# Patient Record
Sex: MALE | Race: ASIAN
[De-identification: names, ages, dates, MRNs, and addresses within clinical notes are randomized per-mention and may not be internally consistent; named-entity substitution may affect disease eponyms.]

---

## 2019-08-05 ENCOUNTER — HOSPITAL ENCOUNTER (OUTPATIENT)
Dept: HOSPITAL 47 - PROCWHC3 | Age: 74
Discharge: HOME | End: 2019-08-05
Payer: MEDICARE

## 2019-08-05 DIAGNOSIS — Z53.9: Primary | ICD-10-CM

## 2020-01-23 ENCOUNTER — HOSPITAL ENCOUNTER (INPATIENT)
Dept: HOSPITAL 47 - EC | Age: 75
LOS: 2 days | Discharge: HOME | DRG: 309 | End: 2020-01-25
Attending: INTERNAL MEDICINE | Admitting: INTERNAL MEDICINE
Payer: MEDICARE

## 2020-01-23 DIAGNOSIS — I44.0: ICD-10-CM

## 2020-01-23 DIAGNOSIS — M10.9: ICD-10-CM

## 2020-01-23 DIAGNOSIS — Z95.810: ICD-10-CM

## 2020-01-23 DIAGNOSIS — I47.2: ICD-10-CM

## 2020-01-23 DIAGNOSIS — Z79.899: ICD-10-CM

## 2020-01-23 DIAGNOSIS — Z79.82: ICD-10-CM

## 2020-01-23 DIAGNOSIS — Z98.2: ICD-10-CM

## 2020-01-23 DIAGNOSIS — E11.9: ICD-10-CM

## 2020-01-23 DIAGNOSIS — Z79.51: ICD-10-CM

## 2020-01-23 DIAGNOSIS — I50.22: ICD-10-CM

## 2020-01-23 DIAGNOSIS — Z79.01: ICD-10-CM

## 2020-01-23 DIAGNOSIS — Z87.01: ICD-10-CM

## 2020-01-23 DIAGNOSIS — R79.89: ICD-10-CM

## 2020-01-23 DIAGNOSIS — I42.8: ICD-10-CM

## 2020-01-23 DIAGNOSIS — H91.90: ICD-10-CM

## 2020-01-23 DIAGNOSIS — I48.0: Primary | ICD-10-CM

## 2020-01-23 DIAGNOSIS — E87.5: ICD-10-CM

## 2020-01-23 DIAGNOSIS — Z87.891: ICD-10-CM

## 2020-01-23 DIAGNOSIS — G91.9: ICD-10-CM

## 2020-01-23 DIAGNOSIS — I45.10: ICD-10-CM

## 2020-01-23 DIAGNOSIS — J44.9: ICD-10-CM

## 2020-01-23 DIAGNOSIS — I11.0: ICD-10-CM

## 2020-01-23 LAB
ALBUMIN SERPL-MCNC: 4.1 G/DL (ref 3.5–5)
ALP SERPL-CCNC: 36 U/L (ref 38–126)
ALT SERPL-CCNC: 14 U/L (ref 4–49)
ANION GAP SERPL CALC-SCNC: 8 MMOL/L
APTT BLD: 24.5 SEC (ref 22–30)
AST SERPL-CCNC: 46 U/L (ref 17–59)
BASOPHILS # BLD AUTO: 0.3 K/UL (ref 0–0.2)
BASOPHILS NFR BLD AUTO: 3 %
BUN SERPL-SCNC: 22 MG/DL (ref 9–20)
CALCIUM SPEC-MCNC: 8.9 MG/DL (ref 8.4–10.2)
CHLORIDE SERPL-SCNC: 100 MMOL/L (ref 98–107)
CK SERPL-CCNC: 96 U/L (ref 55–170)
CO2 SERPL-SCNC: 31 MMOL/L (ref 22–30)
DIGOXIN SERPL-MCNC: <0.4 NG/ML
EOSINOPHIL # BLD AUTO: 0.3 K/UL (ref 0–0.7)
EOSINOPHIL NFR BLD AUTO: 3 %
ERYTHROCYTE [DISTWIDTH] IN BLOOD BY AUTOMATED COUNT: 5.12 M/UL (ref 4.3–5.9)
ERYTHROCYTE [DISTWIDTH] IN BLOOD: 14 % (ref 11.5–15.5)
GLUCOSE SERPL-MCNC: 205 MG/DL (ref 74–99)
HCT VFR BLD AUTO: 48.6 % (ref 39–53)
HGB BLD-MCNC: 16 GM/DL (ref 13–17.5)
INR PPP: 1 (ref ?–1.2)
LYMPHOCYTES # SPEC AUTO: 1.6 K/UL (ref 1–4.8)
LYMPHOCYTES NFR SPEC AUTO: 14 %
MAGNESIUM SPEC-SCNC: 2.4 MG/DL (ref 1.6–2.3)
MCH RBC QN AUTO: 31.3 PG (ref 25–35)
MCHC RBC AUTO-ENTMCNC: 33 G/DL (ref 31–37)
MCV RBC AUTO: 94.9 FL (ref 80–100)
MONOCYTES # BLD AUTO: 0.6 K/UL (ref 0–1)
MONOCYTES NFR BLD AUTO: 5 %
NEUTROPHILS # BLD AUTO: 8.4 K/UL (ref 1.3–7.7)
NEUTROPHILS NFR BLD AUTO: 74 %
PLATELET # BLD AUTO: 240 K/UL (ref 150–450)
POTASSIUM SERPL-SCNC: 5.2 MMOL/L (ref 3.5–5.1)
PROT SERPL-MCNC: 7.3 G/DL (ref 6.3–8.2)
PT BLD: 10.8 SEC (ref 9–12)
SODIUM SERPL-SCNC: 139 MMOL/L (ref 137–145)
WBC # BLD AUTO: 11.4 K/UL (ref 3.8–10.6)

## 2020-01-23 PROCEDURE — 94760 N-INVAS EAR/PLS OXIMETRY 1: CPT

## 2020-01-23 PROCEDURE — 80048 BASIC METABOLIC PNL TOTAL CA: CPT

## 2020-01-23 PROCEDURE — 85025 COMPLETE CBC W/AUTO DIFF WBC: CPT

## 2020-01-23 PROCEDURE — 36415 COLL VENOUS BLD VENIPUNCTURE: CPT

## 2020-01-23 PROCEDURE — 94640 AIRWAY INHALATION TREATMENT: CPT

## 2020-01-23 PROCEDURE — 84484 ASSAY OF TROPONIN QUANT: CPT

## 2020-01-23 PROCEDURE — 83735 ASSAY OF MAGNESIUM: CPT

## 2020-01-23 PROCEDURE — 82550 ASSAY OF CK (CPK): CPT

## 2020-01-23 PROCEDURE — 96360 HYDRATION IV INFUSION INIT: CPT

## 2020-01-23 PROCEDURE — 71046 X-RAY EXAM CHEST 2 VIEWS: CPT

## 2020-01-23 PROCEDURE — 83880 ASSAY OF NATRIURETIC PEPTIDE: CPT

## 2020-01-23 PROCEDURE — 85730 THROMBOPLASTIN TIME PARTIAL: CPT

## 2020-01-23 PROCEDURE — 99285 EMERGENCY DEPT VISIT HI MDM: CPT

## 2020-01-23 PROCEDURE — 80162 ASSAY OF DIGOXIN TOTAL: CPT

## 2020-01-23 PROCEDURE — 85610 PROTHROMBIN TIME: CPT

## 2020-01-23 PROCEDURE — 84443 ASSAY THYROID STIM HORMONE: CPT

## 2020-01-23 PROCEDURE — 85027 COMPLETE CBC AUTOMATED: CPT

## 2020-01-23 PROCEDURE — 80053 COMPREHEN METABOLIC PANEL: CPT

## 2020-01-23 PROCEDURE — 93005 ELECTROCARDIOGRAM TRACING: CPT

## 2020-01-23 RX ADMIN — ISODIUM CHLORIDE SCH MG: 0.03 SOLUTION RESPIRATORY (INHALATION) at 20:09

## 2020-01-23 RX ADMIN — APIXABAN SCH MG: 2.5 TABLET, FILM COATED ORAL at 22:14

## 2020-01-23 RX ADMIN — ALLOPURINOL SCH MG: 100 TABLET ORAL at 22:14

## 2020-01-23 RX ADMIN — ATORVASTATIN CALCIUM STA MG: 80 TABLET, FILM COATED ORAL at 13:54

## 2020-01-23 RX ADMIN — ATORVASTATIN CALCIUM STA: 80 TABLET, FILM COATED ORAL at 13:55

## 2020-01-23 RX ADMIN — ISODIUM CHLORIDE SCH MG: 0.03 SOLUTION RESPIRATORY (INHALATION) at 23:57

## 2020-01-23 RX ADMIN — METOPROLOL TARTRATE SCH MG: 50 TABLET, FILM COATED ORAL at 22:14

## 2020-01-23 NOTE — XR
EXAMINATION TYPE: XR chest 2V

 

DATE OF EXAM: 1/23/2020

 

COMPARISON: 4/14/15

 

HISTORY: Shortness of breath

 

TECHNIQUE:  Frontal and lateral views of the chest are obtained.

 

FINDINGS:

 

Scattered senescent parenchymal changes noted. Hyperinflation compatible with COPD. 

 

No evidence for infiltrate. No evidence for atelectasis.

 

Heart size is stable.

 

Mediastinal structures are stable and grossly unremarkable.

 

No evidence for hilar prominence.

 

Degenerative changes dorsal spine. 

 

IMPRESSION:

1. No evidence for acute pulmonary disease.

## 2020-01-23 NOTE — ED
Arrhythmia/Palpitations HPI





- General


Chief Complaint: Arrhythmia/Palpitations


Stated Complaint: CHEST PAIN


Source: patient, family, EMS


Mode of arrival: EMS


Limitations: no limitations





- History of Present Illness


Initial Comments: 





The patient is a 74 male past medical history of diabetes, COPD and heart 

failure with AICD placement who presents to the emergency department from home. 

He sees Dr. Dhaliwal in office.  He had a pacemaker placed by Dr. Jasso in 

2015.  It is a [employer] device.  States that today he was down in his basement 

with a friend doing some exertional work.  States that he walked upstairs from 

the basement when his defibrillator went off.  He sat down upstairs and states 

that one off again.  He was different related to times in total.  2 times were 

witnessed by EMS when he was walking the stretcher.  He is not on the monitor at

this time.  Patient states that he has had his defibrillator go off on him twice

before however each time it was only once and he was never evaluated.  States 

that the last his pacemaker was interrogated was one year ago in office.  He 

denies feeling off prior to the defibrillation.  Denies any chest pain or 

shortness of breath.  Denies any recent infections.  No nausea, vomiting or 

diarrhea.  Denies any fevers or chills.  No alleviating, precipitating or 

modifying factors





- Related Data


                                Home Medications











 Medication  Instructions  Recorded  Confirmed


 


Allopurinol 2 cap PO BID 04/07/15 01/23/20


 


Budesonide-Formot 160-4.5 Mcg 2 puff INHALATION RT-DAILY 04/07/15 01/23/20





[Symbicort 160-4.5 Mcg Inhaler]   


 


Furosemide 40 mg PO DAILY 04/07/15 01/23/20


 


Albuterol Nebulized [Ventolin 2.5 mg INHALATION RT-Q4H 01/23/20 01/23/20





Nebulized]   


 


Albuterol Sulfate [Proair Hfa] 2 puff INHALATION RT-DAILY 01/23/20 01/23/20


 


Apixaban [Eliquis] 2.5 mg PO BID 01/23/20 01/23/20


 


Aspirin EC [Ecotrin Low Dose] 81 mg PO DAILY 01/23/20 01/23/20


 


Calcitriol [Rocaltrol] 0.25 mcg PO MO 01/23/20 01/23/20


 


Docusate [Colace] 100 mg PO DAILY 01/23/20 01/23/20


 


Ketoconazole 2% Shampoo [Nizoral] 1 applic TOPICAL AS DIRECTED 01/23/20 01/23/20


 


Metoprolol Tartrate [Lopressor] 50 mg PO BID 01/23/20 01/23/20


 


Potassium Chloride ER [K-Dur 10] 10 meq PO DAILY 01/23/20 01/23/20


 


Rosuvastatin Calcium [Crestor] 40 mg PO DAILY 01/23/20 01/23/20


 


Tiotropium 18 Mcg/Puff [Spiriva] 1 puff INHALATION RT-DAILY 01/23/20 01/23/20











                                    Allergies











Allergy/AdvReac Type Severity Reaction Status Date / Time


 


No Known Allergies Allergy   Verified 01/23/20 12:42














Review of Systems


ROS Statement: 


Those systems with pertinent positive or pertinent negative responses have been 

documented in the HPI.





ROS Other: All systems not noted in ROS Statement are negative.





Past Medical History


Past Medical History: Heart Failure, COPD, Diabetes Mellitus, Hearing Disorder /

 Deafness, Hypertension, Musculoskeletal Disorder, Pneumonia


Additional Past Medical History / Comment(s): HYDROCEPHALUS.  CARDIOMYOPATHY.  

SEE DR CUELLAR'S H&P.  GOUT.


History of Any Multi-Drug Resistant Organisms: None Reported


Past Surgical History: Ventriculoperitoneal Shunt


Additional Past Surgical History / Comment(s): SHUNT 2/16/15.


Past Anesthesia/Blood Transfusion Reactions: No Reported Reaction


Past Psychological History: No Psychological Hx Reported


Smoking Status: Former smoker





General Exam


Limitations: no limitations





Course


                                   Vital Signs











  01/23/20 01/23/20 01/23/20





  12:11 12:23 12:30


 


Temperature  98.7 F 


 


Pulse Rate  110 H 103 H


 


Respiratory  20 16





Rate   


 


Blood Pressure 129/111 136/100 136/100


 


O2 Sat by Pulse  99 98





Oximetry   














  01/23/20 01/23/20 01/23/20





  13:00 13:30 14:00


 


Temperature   


 


Pulse Rate 98 93 90


 


Respiratory 18 17 18





Rate   


 


Blood Pressure 117/104 122/91 106/86


 


O2 Sat by Pulse 99 100 





Oximetry   














  01/23/20 01/23/20 01/23/20





  15:00 15:30 17:39


 


Temperature  98.5 F 


 


Pulse Rate 85 86 92


 


Respiratory 18 20 18





Rate   


 


Blood Pressure 110/87 108/87 108/80


 


O2 Sat by Pulse  100 98





Oximetry   














EKG Findings





- EKG Comments:


EKG Findings:: EKG demonstrates sinus tachycardia with frequent PVCs.  There is 

a right bundle jackeline block.  Rate of 101.  UT interval 232.  .  QTC of 

482.





Medical Decision Making





- Medical Decision Making





Upon arrival in the patient is placed in room 5.  A thorough history and 

physical exam was performed.  We did interrogate the patient's device.  I'm 

currently awaiting a report for this.  I did recommend laboratory studies.  

Peripheral IV was established.  Lab studies demonstrated with also, 11.4.  Po

tassium 5.2.  Creatinine is 1.4 with a measurement of 1.1 in 2016.  Glucose is 

205.  Troponin elevated at 0.218.  BNP is 3830 digitoxin is drawn however found 

out that the patient was taken off of this medication.  He has been taking all 

his other medications as directed.  I did perform a chest x-ray which 

demonstrates no evidence of acute pulmonary disease.  At this time I discussed 

diagnosis, differential and treatment options.  I did recommend hospital 

admission for cardiology evaluation.  I called and discussed the case with Dr. Gallagher who accepted admission for the patient.  I will place cariology consult. 

 He is currently awaiting a bed on the floor





- Lab Data


Result diagrams: 


                                 01/23/20 13:06





                                 01/23/20 13:06


                                   Lab Results











  01/23/20 01/23/20 01/23/20 Range/Units





  13:06 13:06 13:06 


 


WBC  11.4 H    (3.8-10.6)  k/uL


 


RBC  5.12    (4.30-5.90)  m/uL


 


Hgb  16.0    (13.0-17.5)  gm/dL


 


Hct  48.6    (39.0-53.0)  %


 


MCV  94.9    (80.0-100.0)  fL


 


MCH  31.3    (25.0-35.0)  pg


 


MCHC  33.0    (31.0-37.0)  g/dL


 


RDW  14.0    (11.5-15.5)  %


 


Plt Count  240    (150-450)  k/uL


 


Neutrophils %  74    %


 


Lymphocytes %  14    %


 


Monocytes %  5    %


 


Eosinophils %  3    %


 


Basophils %  3    %


 


Neutrophils #  8.4 H    (1.3-7.7)  k/uL


 


Lymphocytes #  1.6    (1.0-4.8)  k/uL


 


Monocytes #  0.6    (0-1.0)  k/uL


 


Eosinophils #  0.3    (0-0.7)  k/uL


 


Basophils #  0.3 H    (0-0.2)  k/uL


 


PT     (9.0-12.0)  sec


 


INR     (<1.2)  


 


APTT     (22.0-30.0)  sec


 


Sodium   139   (137-145)  mmol/L


 


Potassium   5.2 H   (3.5-5.1)  mmol/L


 


Chloride   100   ()  mmol/L


 


Carbon Dioxide   31 H   (22-30)  mmol/L


 


Anion Gap   8   mmol/L


 


BUN   22 H   (9-20)  mg/dL


 


Creatinine   1.40 H   (0.66-1.25)  mg/dL


 


Est GFR (CKD-EPI)AfAm   57   (>60 ml/min/1.73 sqM)  


 


Est GFR (CKD-EPI)NonAf   50   (>60 ml/min/1.73 sqM)  


 


Glucose   205 H   (74-99)  mg/dL


 


Calcium   8.9   (8.4-10.2)  mg/dL


 


Magnesium   2.4 H   (1.6-2.3)  mg/dL


 


Total Bilirubin   0.8   (0.2-1.3)  mg/dL


 


AST   46   (17-59)  U/L


 


ALT   14   (4-49)  U/L


 


Alkaline Phosphatase   36 L   ()  U/L


 


Creatine Kinase   96   ()  U/L


 


Troponin I     (0.000-0.034)  ng/mL


 


NT-Pro-B Natriuret Pep    3830  pg/mL


 


Total Protein   7.3   (6.3-8.2)  g/dL


 


Albumin   4.1   (3.5-5.0)  g/dL


 


Digoxin   <0.4   ng/mL














  01/23/20 01/23/20 Range/Units





  13:06 13:06 


 


WBC    (3.8-10.6)  k/uL


 


RBC    (4.30-5.90)  m/uL


 


Hgb    (13.0-17.5)  gm/dL


 


Hct    (39.0-53.0)  %


 


MCV    (80.0-100.0)  fL


 


MCH    (25.0-35.0)  pg


 


MCHC    (31.0-37.0)  g/dL


 


RDW    (11.5-15.5)  %


 


Plt Count    (150-450)  k/uL


 


Neutrophils %    %


 


Lymphocytes %    %


 


Monocytes %    %


 


Eosinophils %    %


 


Basophils %    %


 


Neutrophils #    (1.3-7.7)  k/uL


 


Lymphocytes #    (1.0-4.8)  k/uL


 


Monocytes #    (0-1.0)  k/uL


 


Eosinophils #    (0-0.7)  k/uL


 


Basophils #    (0-0.2)  k/uL


 


PT  10.8   (9.0-12.0)  sec


 


INR  1.0   (<1.2)  


 


APTT  24.5   (22.0-30.0)  sec


 


Sodium    (137-145)  mmol/L


 


Potassium    (3.5-5.1)  mmol/L


 


Chloride    ()  mmol/L


 


Carbon Dioxide    (22-30)  mmol/L


 


Anion Gap    mmol/L


 


BUN    (9-20)  mg/dL


 


Creatinine    (0.66-1.25)  mg/dL


 


Est GFR (CKD-EPI)AfAm    (>60 ml/min/1.73 sqM)  


 


Est GFR (CKD-EPI)NonAf    (>60 ml/min/1.73 sqM)  


 


Glucose    (74-99)  mg/dL


 


Calcium    (8.4-10.2)  mg/dL


 


Magnesium    (1.6-2.3)  mg/dL


 


Total Bilirubin    (0.2-1.3)  mg/dL


 


AST    (17-59)  U/L


 


ALT    (4-49)  U/L


 


Alkaline Phosphatase    ()  U/L


 


Creatine Kinase    ()  U/L


 


Troponin I   0.218 H*  (0.000-0.034)  ng/mL


 


NT-Pro-B Natriuret Pep    pg/mL


 


Total Protein    (6.3-8.2)  g/dL


 


Albumin    (3.5-5.0)  g/dL


 


Digoxin    ng/mL














Disposition


Clinical Impression: 


 Defibrillator discharge





Disposition: ADMITTED AS IP TO THIS [facility]


Condition: Serious


Is patient prescribed a controlled substance at d/c from ED?: No


Decision to Admit Reason: Admit from EC


Decision Date: 01/23/20


Decision Time: 13:45

## 2020-01-23 NOTE — P.HPIM
History of Present Illness


H&P Date: 01/23/20


Chief Complaint: AICD discharge





Patient is a 74-year-old male with a known history of hypertension, diabetes 

type 2, chronic CHF with systolic dysfunction, nonischemic cardiomyopathy status

post AICD placement and previous history of smoking presented to ER with 

complaints of defibrillator went off.  Patient went to his basement with a 

friend and was doing some exertional work.  Patient climbed back to upstairs 

from the basement and sat down in the cause.  Certainly patient felt AICD 

discharge.  Patient was trying to get up and AICD fired again.  EMS was called 

and again AICD fired twice while EMS is at home.  Patient was brought to the 

hospital for further evaluation.  Patient says that his last pacemaker 

interrogation was about a year ago at his cardiologist's office.  Otherwise 

patient denied any recent illnesses.  Denied any complaints of chest pain or 

shortness of breath during or after the episode.  Patient felt tired after that.

 Denied any nausea vomiting or diaphoresis.  No cough or sputum production.  No 

fever no chills.


EKG showed Sinus tachycardia with first degree AV block and right bundle branch 

block.


Chest x-ray showed no acute cardiopulmonary process.


BNP 3830


Troponin 0.218 and 0.933


Potassium 5.2 and creatinine 1.4








Review of Systems





Constitutional: Patient denies any fever or chills .  No generalized weakness or

weight loss.  


Abdomen: Patient denied nausea vomiting and diarrhea and abdominal pain.


Cardiovascular: Patient denies any chest pain or short of breath no 

palpitations.


Respiratory: patient denied any cough is from production.  No shortness of 

breath


Neurologic: Patient denied any numbness or tingling headache.


Musculoskeletal: Patient denies any complaints of joint swelling or deformity.


Skin: Negative


Psychiatric: Negative


Endocrine: No heat or cold intolerance.  No recent weight gain.


Genitourinary: No dysuria or hematuria.


All other 14 point ROS negative except the above





Past Medical History


Past Medical History: Heart Failure, COPD, Diabetes Mellitus, Hearing Disorder /

Deafness, Hypertension, Musculoskeletal Disorder, Pneumonia


Additional Past Medical History / Comment(s): HYDROCEPHALUS.  CARDIOMYOPATHY.  

SEE DR CUELLAR'S H&P.  GOUT.


History of Any Multi-Drug Resistant Organisms: None Reported


Past Surgical History: Ventriculoperitoneal Shunt


Additional Past Surgical History / Comment(s): SHUNT 2/16/15.


Past Anesthesia/Blood Transfusion Reactions: No Reported Reaction


Past Psychological History: No Psychological Hx Reported


Smoking Status: Former smoker





Medications and Allergies


                                Home Medications











 Medication  Instructions  Recorded  Confirmed  Type


 


Allopurinol 2 cap PO BID 04/07/15 01/23/20 History


 


Budesonide-Formot 160-4.5 Mcg 2 puff INHALATION RT-DAILY 04/07/15 01/23/20 

History





[Symbicort 160-4.5 Mcg Inhaler]    


 


Furosemide 40 mg PO DAILY 04/07/15 01/23/20 History


 


Albuterol Nebulized [Ventolin 2.5 mg INHALATION RT-Q4H 01/23/20 01/23/20 History





Nebulized]    


 


Albuterol Sulfate [Proair Hfa] 2 puff INHALATION RT-DAILY 01/23/20 01/23/20 

History


 


Apixaban [Eliquis] 2.5 mg PO BID 01/23/20 01/23/20 History


 


Aspirin EC [Ecotrin Low Dose] 81 mg PO DAILY 01/23/20 01/23/20 History


 


Calcitriol [Rocaltrol] 0.25 mcg PO MO 01/23/20 01/23/20 History


 


Docusate [Colace] 100 mg PO DAILY 01/23/20 01/23/20 History


 


Ketoconazole 2% Shampoo [Nizoral] 1 applic TOPICAL AS DIRECTED 01/23/20 01/23/20

 History


 


Metoprolol Tartrate [Lopressor] 50 mg PO BID 01/23/20 01/23/20 History


 


Potassium Chloride ER [K-Dur 10] 10 meq PO DAILY 01/23/20 01/23/20 History


 


Rosuvastatin Calcium [Crestor] 40 mg PO DAILY 01/23/20 01/23/20 History


 


Tiotropium 18 Mcg/Puff [Spiriva] 1 puff INHALATION RT-DAILY 01/23/20 01/23/20 

History








                                    Allergies











Allergy/AdvReac Type Severity Reaction Status Date / Time


 


No Known Allergies Allergy   Verified 01/23/20 12:42














Physical Exam


Vitals: 


                                   Vital Signs











  Temp Pulse Resp BP Pulse Ox


 


 01/23/20 20:22   97   


 


 01/23/20 20:10   95   


 


 01/23/20 17:39   92  18  108/80  98


 


 01/23/20 15:30  98.5 F  86  20  108/87  100


 


 01/23/20 15:00   85  18  110/87 


 


 01/23/20 14:00   90  18  106/86 


 


 01/23/20 13:30   93  17  122/91  100


 


 01/23/20 13:00   98  18  117/104  99


 


 01/23/20 12:30   103 H  16  136/100  98


 


 01/23/20 12:23  98.7 F  110 H  20  136/100  99


 


 01/23/20 12:11     129/111 








                                Intake and Output











 01/23/20 01/23/20 01/23/20





 06:59 14:59 22:59


 


Other:   


 


  Weight  67.585 kg 














PHYSICAL EXAMINATION: 


Patient is lying in the bed comfortably, no acute distress, awake alert and 

oriented.. 


HEENT: Normocephalic. Neck is supple. Pupils reactive. Nostrils clear. Oral 

cavity is moist. Ears reveal no drainage. 


Neck reveals no JVD, carotid bruits, or thyromegaly. 


CHEST EXAMINATION: Trachea is central. Symmetrical expansion. Lung fields clear 

to auscultation and percussion. 


CARDIAC: Normal S1, S2 with no gallops. No murmurs 


ABDOMEN: Soft. Bowel sounds normal. No organomegaly. No abdominal bruits. 


Extremities: reveal no edema.  No clubbing or cyanosis


Neurologically awake, alert, oriented x3 with well-coordinated movements.  No 

focal deficits noted


Skin: No rash or skin lesions. 


Psychiatric: Coperative.  Nonsuicidal


Musculoskeletal: No joint swelling or deformity.  Normal range of motion.








Results


CBC & Chem 7: 


                                 01/23/20 13:06





                                 01/23/20 13:06


Labs: 


                  Abnormal Lab Results - Last 24 Hours (Table)











  01/23/20 01/23/20 01/23/20 Range/Units





  13:06 13:06 13:06 


 


WBC  11.4 H    (3.8-10.6)  k/uL


 


Neutrophils #  8.4 H    (1.3-7.7)  k/uL


 


Basophils #  0.3 H    (0-0.2)  k/uL


 


Potassium   5.2 H   (3.5-5.1)  mmol/L


 


Carbon Dioxide   31 H   (22-30)  mmol/L


 


BUN   22 H   (9-20)  mg/dL


 


Creatinine   1.40 H   (0.66-1.25)  mg/dL


 


Glucose   205 H   (74-99)  mg/dL


 


Magnesium   2.4 H   (1.6-2.3)  mg/dL


 


Alkaline Phosphatase   36 L   ()  U/L


 


Troponin I    0.218 H*  (0.000-0.034)  ng/mL














  01/23/20 Range/Units





  18:50 


 


WBC   (3.8-10.6)  k/uL


 


Neutrophils #   (1.3-7.7)  k/uL


 


Basophils #   (0-0.2)  k/uL


 


Potassium   (3.5-5.1)  mmol/L


 


Carbon Dioxide   (22-30)  mmol/L


 


BUN   (9-20)  mg/dL


 


Creatinine   (0.66-1.25)  mg/dL


 


Glucose   (74-99)  mg/dL


 


Magnesium   (1.6-2.3)  mg/dL


 


Alkaline Phosphatase   ()  U/L


 


Troponin I  0.933 H*  (0.000-0.034)  ng/mL














Thrombosis Risk Factor Assmnt





- DVT/VTE Prophylaxis


DVT/VTE Prophylaxis: Pharmacologic Prophylaxis ordered





Assessment and Plan


Assessment: 





AICD discharge.  Rule out arrhythmia.


Nonischemic cardiomyopathy status post AICD placement in 2015


Chronic CHF with systolic dysfunction


Hydrocephalus status post ventricular peritoneal shunt


Hearing disorder


COPD


Previous history of smoking


Hypertension


Diabetes type 2 nausea independent


History of gout


Mild hyperkalemia potassium 5.1 admission





Plan:


Patient will be continued on telemetry monitoring.  Continue with home 

medications including metoprolol.  Cardiology was consulted.  Pacemaker to be 

interrogated.  Continue to monitor closely and further recommendations based on 

the clinical course.


Time with Patient: Greater than 30

## 2020-01-24 LAB
ANION GAP SERPL CALC-SCNC: 10 MMOL/L
BUN SERPL-SCNC: 27 MG/DL (ref 9–20)
CALCIUM SPEC-MCNC: 9.2 MG/DL (ref 8.4–10.2)
CHLORIDE SERPL-SCNC: 98 MMOL/L (ref 98–107)
CO2 SERPL-SCNC: 32 MMOL/L (ref 22–30)
ERYTHROCYTE [DISTWIDTH] IN BLOOD BY AUTOMATED COUNT: 4.93 M/UL (ref 4.3–5.9)
ERYTHROCYTE [DISTWIDTH] IN BLOOD: 14 % (ref 11.5–15.5)
GLUCOSE BLD-MCNC: 142 MG/DL (ref 75–99)
GLUCOSE SERPL-MCNC: 158 MG/DL (ref 74–99)
HCT VFR BLD AUTO: 47 % (ref 39–53)
HGB BLD-MCNC: 15.4 GM/DL (ref 13–17.5)
MAGNESIUM SPEC-SCNC: 2.4 MG/DL (ref 1.6–2.3)
MCH RBC QN AUTO: 31.3 PG (ref 25–35)
MCHC RBC AUTO-ENTMCNC: 32.8 G/DL (ref 31–37)
MCV RBC AUTO: 95.4 FL (ref 80–100)
PLATELET # BLD AUTO: 267 K/UL (ref 150–450)
POTASSIUM SERPL-SCNC: 4.2 MMOL/L (ref 3.5–5.1)
SODIUM SERPL-SCNC: 140 MMOL/L (ref 137–145)
WBC # BLD AUTO: 13.1 K/UL (ref 3.8–10.6)

## 2020-01-24 PROCEDURE — 4B02XTZ MEASUREMENT OF CARDIAC DEFIBRILLATOR, EXTERNAL APPROACH: ICD-10-PCS

## 2020-01-24 RX ADMIN — IPRATROPIUM BROMIDE SCH: 0.5 SOLUTION RESPIRATORY (INHALATION) at 11:47

## 2020-01-24 RX ADMIN — ISODIUM CHLORIDE SCH MG: 0.03 SOLUTION RESPIRATORY (INHALATION) at 09:03

## 2020-01-24 RX ADMIN — ATORVASTATIN CALCIUM SCH MG: 80 TABLET, FILM COATED ORAL at 08:48

## 2020-01-24 RX ADMIN — METOPROLOL TARTRATE SCH MG: 50 TABLET, FILM COATED ORAL at 21:49

## 2020-01-24 RX ADMIN — ISODIUM CHLORIDE SCH MG: 0.03 SOLUTION RESPIRATORY (INHALATION) at 16:01

## 2020-01-24 RX ADMIN — IPRATROPIUM BROMIDE SCH MG: 0.5 SOLUTION RESPIRATORY (INHALATION) at 19:57

## 2020-01-24 RX ADMIN — ISODIUM CHLORIDE SCH MG: 0.03 SOLUTION RESPIRATORY (INHALATION) at 23:05

## 2020-01-24 RX ADMIN — METOPROLOL TARTRATE SCH MG: 50 TABLET, FILM COATED ORAL at 08:47

## 2020-01-24 RX ADMIN — ISODIUM CHLORIDE SCH MG: 0.03 SOLUTION RESPIRATORY (INHALATION) at 19:57

## 2020-01-24 RX ADMIN — APIXABAN SCH MG: 2.5 TABLET, FILM COATED ORAL at 08:48

## 2020-01-24 RX ADMIN — IPRATROPIUM BROMIDE SCH MG: 0.5 SOLUTION RESPIRATORY (INHALATION) at 16:01

## 2020-01-24 RX ADMIN — IPRATROPIUM BROMIDE SCH MG: 0.5 SOLUTION RESPIRATORY (INHALATION) at 09:03

## 2020-01-24 RX ADMIN — APIXABAN SCH MG: 5 TABLET, FILM COATED ORAL at 21:49

## 2020-01-24 RX ADMIN — FUROSEMIDE SCH MG: 40 TABLET ORAL at 08:49

## 2020-01-24 RX ADMIN — DOCUSATE SODIUM SCH MG: 100 CAPSULE, LIQUID FILLED ORAL at 08:48

## 2020-01-24 RX ADMIN — ISODIUM CHLORIDE SCH: 0.03 SOLUTION RESPIRATORY (INHALATION) at 11:47

## 2020-01-24 RX ADMIN — ALLOPURINOL SCH MG: 100 TABLET ORAL at 21:48

## 2020-01-24 RX ADMIN — ASPIRIN 81 MG CHEWABLE TABLET SCH MG: 81 TABLET CHEWABLE at 08:48

## 2020-01-24 RX ADMIN — ALLOPURINOL SCH MG: 100 TABLET ORAL at 08:48

## 2020-01-24 RX ADMIN — BUDESONIDE AND FORMOTEROL FUMARATE DIHYDRATE SCH PUFF: 160; 4.5 AEROSOL RESPIRATORY (INHALATION) at 09:03

## 2020-01-24 RX ADMIN — ISODIUM CHLORIDE SCH: 0.03 SOLUTION RESPIRATORY (INHALATION) at 03:54

## 2020-01-24 NOTE — P.CRDCN
History of Present Illness


History of present illness: 


This is Ewelina Zamora PA-C dictating a consult on this patient


The patient was interviewed and examined by me as well as by Dr. Mcgraw


Case discussed with Dr. Mcgraw and he agrees with the plan of care





HPI


Patient is a 74-year-old male with a past medical history of persistant atrial 

fibrillation and nonischemic cardiomyopathy status post ICD who presented after 

4 ICD shocks.  Patient follows with Dr. BUNNY Dhaliwal.  He states he was in his 

basement walking towards the stairs when he experienced an ICD shock.  He did 

not have any symptoms prior to the shock.  he denies palpitations, dizziness, 

chest pressure, shortness of breath.  He was not doing anything exertional prior

to the shock.  He made his way up the stairs and experienced 3 more shocks.  He 

did not have any dizziness or loss of consciousness.  No chest pain.  In the 

preceding few weeks he had some dizziness on and off but denied any dizziness at

that time.  No loss of consciousness.  His wife called EMS.  EKG on arrival 

shows sinus mechanism with PVCs.  No acute ST or T-wave changes.  Labs were 

significant for BUN 22, creatinine 1.4, potassium 5.2, magnesium 2.4.  Patient 

seen and examined sitting in his chair.  No further shocks.  Denies 

palpitations, dizziness, chest pain or shortness of breath.





ROS: 


No fevers, chills or rigors, 


no cough, phlegm or expectoration, 


no nausea, vomiting or diarrhea, 


no hematuria, dysuria, 


no musculoskeletal complaints, 


no strokes or seizures, 


no skin lesions.





EXAMINATION:


Patient is afebrile, pulse in the 90s, respirations 18, blood pressure 122/80, 

oxygen saturation 94% on room air


Patient seen and examined resting comfortably in his chair, in no acute distress


Lungs are clear to auscultation bilaterally no wheezing rhonchi or crackles


Heart is irregularly irregular.  No audible murmurs


No lower extremity edema





REVIEW OF LABS, ECG & MEDICAL DATA


WBC 13.1, hemoglobin 14.4, platelets 267, potassium 4, BUN 27, creatinine 1.56, 

magnesium 2.4


Troponin 0.933, 0.218


Had a few episodes of NSVT on telemetry





IMPRESSION / ASSESSMENT: 


74-year-old male presenting after 4 ICD shocks, likely inappropriate shocks due 

to atrial fibrillation with aberrancy


Elevated troponin secondary to above


Nonischemic cardiomyopathy status post ICD placement


Paroxysmal atrial fibrillation, anticoagulated on eliquis





PLAN:


Device was interrogated, shows shows wide complex tachycardia consistent with 

aberrancy, device was reprogrammed, please see separate dictation by Dr. Mcgraw

for the details


Increase metoprolol to 100 mg twice a day


Increase eliquis to 5 mg twice a day, rate dose given his age weight and renal 

function


Check TSH











Past Medical History


Past Medical History: Heart Failure, COPD, Diabetes Mellitus, Hearing Disorder /

Deafness, Hypertension, Musculoskeletal Disorder, Pneumonia


Additional Past Medical History / Comment(s): HYDROCEPHALUS.  CARDIOMYOPATHY.  

SEE DR MCGRAW'S H&P.  GOUT.


History of Any Multi-Drug Resistant Organisms: None Reported


Past Surgical History: Ventriculoperitoneal Shunt


Additional Past Surgical History / Comment(s): SHUNT 2/16/15.


Past Anesthesia/Blood Transfusion Reactions: No Reported Reaction


Past Psychological History: No Psychological Hx Reported


Smoking Status: Former smoker





Medications and Allergies


                                Home Medications











 Medication  Instructions  Recorded  Confirmed  Type


 


Allopurinol 2 cap PO BID 04/07/15 01/23/20 History


 


Budesonide-Formot 160-4.5 Mcg 2 puff INHALATION RT-DAILY 04/07/15 01/23/20 

History





[Symbicort 160-4.5 Mcg Inhaler]    


 


Furosemide 40 mg PO DAILY 04/07/15 01/23/20 History


 


Albuterol Nebulized [Ventolin 2.5 mg INHALATION RT-Q4H 01/23/20 01/23/20 History





Nebulized]    


 


Albuterol Sulfate [Proair Hfa] 2 puff INHALATION RT-DAILY 01/23/20 01/23/20 

History


 


Apixaban [Eliquis] 2.5 mg PO BID 01/23/20 01/23/20 History


 


Aspirin EC [Ecotrin Low Dose] 81 mg PO DAILY 01/23/20 01/23/20 History


 


Calcitriol [Rocaltrol] 0.25 mcg PO MO 01/23/20 01/23/20 History


 


Docusate [Colace] 100 mg PO DAILY 01/23/20 01/23/20 History


 


Ketoconazole 2% Shampoo [Nizoral] 1 applic TOPICAL AS DIRECTED 01/23/20 01/23/20

 History


 


Metoprolol Tartrate [Lopressor] 50 mg PO BID 01/23/20 01/23/20 History


 


Potassium Chloride ER [K-Dur 10] 10 meq PO DAILY 01/23/20 01/23/20 History


 


Rosuvastatin Calcium [Crestor] 40 mg PO DAILY 01/23/20 01/23/20 History


 


Tiotropium 18 Mcg/Puff [Spiriva] 1 puff INHALATION RT-DAILY 01/23/20 01/23/20 

History








                                    Allergies











Allergy/AdvReac Type Severity Reaction Status Date / Time


 


No Known Allergies Allergy   Verified 01/23/20 12:42














Physical Exam


Vitals: 


                                   Vital Signs











  Temp Pulse Pulse Resp BP BP Pulse Ox


 


 01/24/20 11:00  97.7 F   114 H  18   122/80  94 L


 


 01/24/20 09:28   90     


 


 01/24/20 09:03   88     


 


 01/24/20 08:47    63  18   


 


 01/24/20 08:00  98.2 F   63  18   135/79  94 L


 


 01/24/20 04:40     18   132/75  96


 


 01/24/20 04:00     18   


 


 01/24/20 00:11   92     


 


 01/24/20 00:00     18   


 


 01/23/20 23:58   96     


 


 01/23/20 20:22   97     


 


 01/23/20 20:10   95     


 


 01/23/20 20:00   95   18  107/83   99


 


 01/23/20 19:00   97   17  100/85  


 


 01/23/20 18:00   93   20  108/80  


 


 01/23/20 17:39   92   18  108/80   98


 


 01/23/20 17:00   93   19  115/92  


 


 01/23/20 16:00   85   18  105/81  


 


 01/23/20 15:30  98.5 F  86   20  108/87   100


 


 01/23/20 15:05  98 F    18    95


 


 01/23/20 15:00   85   18  110/87  








                                Intake and Output











 01/23/20 01/24/20 01/24/20





 22:59 06:59 14:59


 


Intake Total 200 200 220


 


Balance 200 200 220


 


Intake:   


 


  IV   20


 


    Invasive Line 1   20


 


  Oral 200 200 200


 


Other:   


 


  Weight 67.585 kg 68.3 kg 














Results





                                 01/24/20 08:55





                                 01/24/20 08:55


                                 Cardiac Enzymes











  01/23/20 01/23/20 01/24/20 Range/Units





  13:06 18:50 00:31 


 


Troponin I  0.218 H*  0.933 H*  0.807 H*  (0.000-0.034)  ng/mL








                                       CBC











  01/24/20 Range/Units





  08:55 


 


WBC  13.1 H  (3.8-10.6)  k/uL


 


RBC  4.93  (4.30-5.90)  m/uL


 


Hgb  15.4  (13.0-17.5)  gm/dL


 


Hct  47.0  (39.0-53.0)  %


 


Plt Count  267  (150-450)  k/uL








                          Comprehensive Metabolic Panel











  01/24/20 Range/Units





  08:55 


 


Sodium  140  (137-145)  mmol/L


 


Potassium  4.2  (3.5-5.1)  mmol/L


 


Chloride  98  ()  mmol/L


 


Carbon Dioxide  32 H  (22-30)  mmol/L


 


BUN  27 H  (9-20)  mg/dL


 


Creatinine  1.56 H  (0.66-1.25)  mg/dL


 


Glucose  158 H  (74-99)  mg/dL


 


Calcium  9.2  (8.4-10.2)  mg/dL








                               Current Medications











Generic Name Dose Route Start Last Admin





  Trade Name Freq  PRN Reason Stop Dose Admin


 


Albuterol Sulfate  2.5 mg  01/23/20 20:00  01/24/20 11:47





  Ventolin Nebulized  INHALATION   Not Given





  RT-Q4H [facility]  


 


Allopurinol  200 mg  01/23/20 21:00  01/24/20 08:48





  Zyloprim  PO   200 mg





  BID [facility]ARY   Administration


 


Apixaban  5 mg  01/24/20 21:00 





  Eliquis  PO  





  BID [facility]  


 


Aspirin  81 mg  01/24/20 09:00  01/24/20 08:48





  Aspirin  PO   81 mg





  DAILY [facility]   Administration


 


Atorvastatin Calcium  80 mg  01/24/20 09:00  01/24/20 08:48





  Lipitor  PO   80 mg





  DAILY [facility]   Administration


 


Budesonide/Formoterol Fumarate  2 puff  01/24/20 08:00  01/24/20 09:03





  Symbicort 160-4.5 Mcg Inhaler  INHALATION   2 puff





  RT-DAILY [facility]   Administration


 


Calcitriol  0.25 mcg  01/27/20 09:00 





  Rocaltrol  PO  





  MO [facility]  


 


Docusate Sodium  100 mg  01/24/20 09:00  01/24/20 08:48





  Colace  PO   100 mg





  DAILY [facility]   Administration


 


Furosemide  40 mg  01/24/20 09:00  01/24/20 08:49





  Lasix  PO   40 mg





  DAILY [facility]   Administration


 


Ipratropium Bromide  0.5 mg  01/24/20 08:00  01/24/20 11:47





  Atrovent Nebulized  INHALATION   Not Given





  RT-QID [facility]  


 


Metoprolol Tartrate  100 mg  01/24/20 21:00 





  Lopressor  PO  





  BID [facility]  


 


Naloxone HCl  0.2 mg  01/23/20 13:46 





  Narcan  IV  





  Q2M PRN  





  Opioid Reversal  








                                Intake and Output











 01/23/20 01/24/20 01/24/20





 22:59 06:59 14:59


 


Intake Total 200 200 220


 


Balance 200 200 220


 


Intake:   


 


  IV   20


 


    Invasive Line 1   20


 


  Oral 200 200 200


 


Other:   


 


  Weight 67.585 kg 68.3 kg 








                                        





                                 01/24/20 08:55 





                                 01/24/20 08:55

## 2020-01-24 NOTE — P.PCN
Preoperative Diagnosis: 


Since ICD, [facility] [facility] fortify sure RVR [id number] Q ICD


Patient presented with multiple ICD shocks he had 6 episodes of ventricular ta

chycardia


ATP failed in all 6 cases and 6 shocks were delivered


Patient remained in the tachycardia


RV pacing threshold 0.5 V at 0.5 ms, R waves 11.4 glucose and pacing impedance 

of 440 ohms


HV interval 78 ohms


Tachycardias were reviewed


The sustained tachycardia was a wide complex tachycardia likely aberrancy, 

slight radiation sex-linked in no impact of antitachycardia pacing or shocks


Likely atrial fibrillation/atrial tachycardia with aberrancy.  There was 0% 

morphology match but the intracardiac electrogram was similar





There is also one episode of true nonsustained ventricular tachycardia


The device was then reprogrammed according to the immediate area deprogramming 

with long detection intervals





Impression


Likely inappropriate ICD shocks secondary to atrial fibrillation/atrial tachyca

rdia


1 nonsustained ventricular tachycardia asymptomatic no therapies delivered

## 2020-01-25 VITALS — SYSTOLIC BLOOD PRESSURE: 104 MMHG | DIASTOLIC BLOOD PRESSURE: 70 MMHG | HEART RATE: 61 BPM | RESPIRATION RATE: 18 BRPM

## 2020-01-25 VITALS — TEMPERATURE: 99.6 F

## 2020-01-25 RX ADMIN — METOPROLOL TARTRATE SCH MG: 50 TABLET, FILM COATED ORAL at 08:55

## 2020-01-25 RX ADMIN — IPRATROPIUM BROMIDE AND ALBUTEROL SULFATE SCH ML: .5; 3 SOLUTION RESPIRATORY (INHALATION) at 11:36

## 2020-01-25 RX ADMIN — ATORVASTATIN CALCIUM SCH MG: 80 TABLET, FILM COATED ORAL at 08:54

## 2020-01-25 RX ADMIN — ISODIUM CHLORIDE SCH MG: 0.03 SOLUTION RESPIRATORY (INHALATION) at 03:15

## 2020-01-25 RX ADMIN — IPRATROPIUM BROMIDE AND ALBUTEROL SULFATE SCH ML: .5; 3 SOLUTION RESPIRATORY (INHALATION) at 08:03

## 2020-01-25 RX ADMIN — ALLOPURINOL SCH MG: 100 TABLET ORAL at 08:53

## 2020-01-25 RX ADMIN — ASPIRIN 81 MG CHEWABLE TABLET SCH MG: 81 TABLET CHEWABLE at 08:54

## 2020-01-25 RX ADMIN — FUROSEMIDE SCH MG: 40 TABLET ORAL at 08:54

## 2020-01-25 RX ADMIN — IPRATROPIUM BROMIDE AND ALBUTEROL SULFATE SCH: .5; 3 SOLUTION RESPIRATORY (INHALATION) at 15:36

## 2020-01-25 RX ADMIN — APIXABAN SCH MG: 5 TABLET, FILM COATED ORAL at 08:54

## 2020-01-25 RX ADMIN — DOCUSATE SODIUM SCH MG: 100 CAPSULE, LIQUID FILLED ORAL at 08:54

## 2020-01-25 RX ADMIN — BUDESONIDE AND FORMOTEROL FUMARATE DIHYDRATE SCH PUFF: 160; 4.5 AEROSOL RESPIRATORY (INHALATION) at 08:03

## 2020-01-25 NOTE — P.PN
Subjective


This is Ewelina Zamora PA-C dictating a progress note on this patient


The patient was interviewed and examined by me as well as by Dr. Mcgraw


Case discussed with Dr. Mcgraw and he agrees with the plan of care





HPI/interval history


Patient is a 74-year-old male with a past medical history of persistant atrial 

fibrillation and nonischemic cardiomyopathy status post ICD who presented after 

4 ICD shocks.  His device was interrogated and shows a wide complex tachycardia 

consistent with aberrancy.  His device was reprogrammed by Dr. Mcgraw.  We 

increased his metoprolol to 100 mg twice daily and he seems to have tolerated 

that well.  Patient seen and examined in his room.  Feels well, denies any chest

pain, palpitations, shortness of breath dizziness or syncope.





EXAMINATION


Patient is afebrile, pulse in the 60s, respirations 18, blood pressure 104/70, 

oxygen saturation 98%


Lungs are clear to auscultation bilaterally


Heart is irregularly irregular.  No audible murmurs


No lower extremity edema





REVIEW OF LABS, ECG


WBC 13, hemoglobin 15, platelets 267, potassium 4.2, BUN 27, creatinine 1.56


Telemetry reveals few short runs of NSVT





IMPRESSION / ASSESSMENT: 


74-year-old male presenting after 4 ICD shocks, likely inappropriate shocks due 

to atrial fibrillation with aberrancy, no further shocks during his admission


Elevated troponin secondary to above


Nonischemic cardiomyopathy status post ICD placement


Paroxysmal atrial fibrillation, anticoagulated on eliquis





PLAN:


Continue metoprolol 100 mg twice a day


Continue maximal cardiomyopathy medications


Patient may be discharged home from a cardiac standpoint and plan to schedule 

NIPS with Dr. Mcgraw in the next few weeks
























































Objective





- Vital Signs


Vital signs: 


                                   Vital Signs











Temp  99.6 F   01/25/20 08:15


 


Pulse  96   01/25/20 11:48


 


Resp  18   01/25/20 11:30


 


BP  104/70   01/25/20 11:30


 


Pulse Ox  98   01/25/20 11:30








                                 Intake & Output











 01/24/20 01/25/20 01/25/20





 18:59 06:59 18:59


 


Intake Total 430 400 240


 


Output Total 600  


 


Balance -170 400 240


 


Weight  67.6 kg 


 


Intake:   


 


  IV 30  


 


    Invasive Line 1 30  


 


  Oral 400 400 240


 


Output:   


 


  Urine 600  


 


Other:   


 


  Voiding Method  Toilet 


 


  # Voids   2














- Labs


CBC & Chem 7: 


                                 01/24/20 08:55





                                 01/24/20 08:55

## 2022-10-01 ENCOUNTER — HOSPITAL ENCOUNTER (INPATIENT)
Dept: HOSPITAL 47 - EC | Age: 77
LOS: 1 days | DRG: 951 | End: 2022-10-02
Attending: INTERNAL MEDICINE | Admitting: INTERNAL MEDICINE
Payer: MEDICARE

## 2022-10-01 DIAGNOSIS — Z79.82: ICD-10-CM

## 2022-10-01 DIAGNOSIS — E11.22: ICD-10-CM

## 2022-10-01 DIAGNOSIS — I48.92: ICD-10-CM

## 2022-10-01 DIAGNOSIS — Z20.822: ICD-10-CM

## 2022-10-01 DIAGNOSIS — Z51.5: Primary | ICD-10-CM

## 2022-10-01 DIAGNOSIS — N17.9: ICD-10-CM

## 2022-10-01 DIAGNOSIS — R79.89: ICD-10-CM

## 2022-10-01 DIAGNOSIS — H91.90: ICD-10-CM

## 2022-10-01 DIAGNOSIS — Z87.01: ICD-10-CM

## 2022-10-01 DIAGNOSIS — N43.3: ICD-10-CM

## 2022-10-01 DIAGNOSIS — G93.40: ICD-10-CM

## 2022-10-01 DIAGNOSIS — Z79.4: ICD-10-CM

## 2022-10-01 DIAGNOSIS — E87.5: ICD-10-CM

## 2022-10-01 DIAGNOSIS — I50.9: ICD-10-CM

## 2022-10-01 DIAGNOSIS — N18.9: ICD-10-CM

## 2022-10-01 DIAGNOSIS — Z79.01: ICD-10-CM

## 2022-10-01 DIAGNOSIS — E87.20: ICD-10-CM

## 2022-10-01 DIAGNOSIS — Z95.810: ICD-10-CM

## 2022-10-01 DIAGNOSIS — I42.9: ICD-10-CM

## 2022-10-01 DIAGNOSIS — Z98.2: ICD-10-CM

## 2022-10-01 DIAGNOSIS — F91.9: ICD-10-CM

## 2022-10-01 DIAGNOSIS — I13.0: ICD-10-CM

## 2022-10-01 DIAGNOSIS — Z79.899: ICD-10-CM

## 2022-10-01 DIAGNOSIS — Z79.51: ICD-10-CM

## 2022-10-01 DIAGNOSIS — J43.9: ICD-10-CM

## 2022-10-01 DIAGNOSIS — M10.9: ICD-10-CM

## 2022-10-01 DIAGNOSIS — I48.91: ICD-10-CM

## 2022-10-01 LAB
ALBUMIN SERPL-MCNC: 4.3 G/DL (ref 3.5–5)
ALP SERPL-CCNC: 47 U/L (ref 38–126)
ALT SERPL-CCNC: 39 U/L (ref 4–49)
ANION GAP SERPL CALC-SCNC: 23 MMOL/L
APTT BLD: 29 SEC (ref 22–30)
AST SERPL-CCNC: 78 U/L (ref 17–59)
BASOPHILS # BLD AUTO: 0 K/UL (ref 0–0.2)
BASOPHILS NFR BLD AUTO: 0 %
BUN SERPL-SCNC: 19 MG/DL (ref 9–20)
CALCIUM SPEC-MCNC: 8.7 MG/DL (ref 8.4–10.2)
CHLORIDE SERPL-SCNC: 93 MMOL/L (ref 98–107)
CO2 SERPL-SCNC: 16 MMOL/L (ref 22–30)
EOSINOPHIL # BLD AUTO: 0.1 K/UL (ref 0–0.7)
EOSINOPHIL NFR BLD AUTO: 1 %
ERYTHROCYTE [DISTWIDTH] IN BLOOD BY AUTOMATED COUNT: 4.74 M/UL (ref 4.3–5.9)
ERYTHROCYTE [DISTWIDTH] IN BLOOD: 13.1 % (ref 11.5–15.5)
GLUCOSE SERPL-MCNC: 139 MG/DL (ref 74–99)
HCT VFR BLD AUTO: 46.7 % (ref 39–53)
HGB BLD-MCNC: 15.1 GM/DL (ref 13–17.5)
INR PPP: 2 (ref ?–1.2)
LYMPHOCYTES # SPEC AUTO: 1 K/UL (ref 1–4.8)
LYMPHOCYTES NFR SPEC AUTO: 10 %
MAGNESIUM SPEC-SCNC: 2.5 MG/DL (ref 1.6–2.3)
MCH RBC QN AUTO: 31.8 PG (ref 25–35)
MCHC RBC AUTO-ENTMCNC: 32.4 G/DL (ref 31–37)
MCV RBC AUTO: 98.4 FL (ref 80–100)
MONOCYTES # BLD AUTO: 0.6 K/UL (ref 0–1)
MONOCYTES NFR BLD AUTO: 6 %
NEUTROPHILS # BLD AUTO: 8.3 K/UL (ref 1.3–7.7)
NEUTROPHILS NFR BLD AUTO: 82 %
PLATELET # BLD AUTO: 152 K/UL (ref 150–450)
POTASSIUM SERPL-SCNC: 5.8 MMOL/L (ref 3.5–5.1)
PROT SERPL-MCNC: 6.9 G/DL (ref 6.3–8.2)
PT BLD: 20.3 SEC (ref 9–12)
SODIUM SERPL-SCNC: 132 MMOL/L (ref 137–145)
WBC # BLD AUTO: 10.1 K/UL (ref 3.8–10.6)

## 2022-10-01 PROCEDURE — 85025 COMPLETE CBC W/AUTO DIFF WBC: CPT

## 2022-10-01 PROCEDURE — 84100 ASSAY OF PHOSPHORUS: CPT

## 2022-10-01 PROCEDURE — 84443 ASSAY THYROID STIM HORMONE: CPT

## 2022-10-01 PROCEDURE — 36415 COLL VENOUS BLD VENIPUNCTURE: CPT

## 2022-10-01 PROCEDURE — 87502 INFLUENZA DNA AMP PROBE: CPT

## 2022-10-01 PROCEDURE — 84484 ASSAY OF TROPONIN QUANT: CPT

## 2022-10-01 PROCEDURE — 83605 ASSAY OF LACTIC ACID: CPT

## 2022-10-01 PROCEDURE — 70450 CT HEAD/BRAIN W/O DYE: CPT

## 2022-10-01 PROCEDURE — 83735 ASSAY OF MAGNESIUM: CPT

## 2022-10-01 PROCEDURE — 83880 ASSAY OF NATRIURETIC PEPTIDE: CPT

## 2022-10-01 PROCEDURE — 85730 THROMBOPLASTIN TIME PARTIAL: CPT

## 2022-10-01 PROCEDURE — 87635 SARS-COV-2 COVID-19 AMP PRB: CPT

## 2022-10-01 PROCEDURE — 80053 COMPREHEN METABOLIC PANEL: CPT

## 2022-10-01 PROCEDURE — 71045 X-RAY EXAM CHEST 1 VIEW: CPT

## 2022-10-01 PROCEDURE — 93005 ELECTROCARDIOGRAM TRACING: CPT

## 2022-10-01 PROCEDURE — 85610 PROTHROMBIN TIME: CPT

## 2022-10-01 PROCEDURE — 94640 AIRWAY INHALATION TREATMENT: CPT

## 2022-10-01 NOTE — ED
Weakness HPI





- General


Stated complaint: SOB


Time Seen by Provider: 10/01/22 22:52


Source: RN notes reviewed, old records reviewed


Limitations: no limitations





- History of Present Illness


MD Complaint: generalized weakness, lack of energy


-: days(s)


Location: generalized


Severity: moderate


Severity scale (1-10): 7


Consistency: constant, intermittent


Improves with: evening


Context: recent illness, history of similar


Associated Symptoms: chest pain, loss of appetite, nausea/vomiting, shortness of

breath





- Related Data


                                Home Medications











 Medication  Instructions  Recorded  Confirmed


 


Budesonide-Formot 160-4.5 Mcg 2 puff INHALATION RT-DAILY 04/07/15 01/23/20





[Symbicort 160-4.5 Mcg Inhaler]   


 


Furosemide 40 mg PO DAILY 04/07/15 01/23/20


 


allopurinoL [Allopurinol] 2 cap PO BID 04/07/15 01/23/20


 


Albuterol Nebulized [Ventolin 2.5 mg INHALATION RT-Q4H 01/23/20 01/23/20





Nebulized]   


 


Albuterol Sulfate [Proair Hfa] 2 puff INHALATION RT-DAILY 01/23/20 01/23/20


 


Aspirin EC [Ecotrin Low Dose] 81 mg PO DAILY 01/23/20 01/23/20


 


Docusate [Colace] 100 mg PO DAILY 01/23/20 01/23/20


 


Ketoconazole 2% Shampoo [Nizoral] 1 applic TOPICAL AS DIRECTED 01/23/20 01/23/20


 


Rosuvastatin Calcium [Crestor] 40 mg PO DAILY 01/23/20 01/23/20


 


Tiotropium 18 Mcg/Puff [Spiriva] 1 puff INHALATION RT-DAILY 01/23/20 01/23/20


 


calcitrioL [Rocaltrol] 0.25 mcg PO MO 01/23/20 01/23/20








                                  Previous Rx's











 Medication  Instructions  Recorded


 


Apixaban [Eliquis] 5 mg PO BID #60 tab 01/25/20


 


Metoprolol Tartrate [Lopressor] 100 mg PO BID  tab 01/25/20











                                    Allergies











Allergy/AdvReac Type Severity Reaction Status Date / Time


 


No Known Allergies Allergy   Verified 01/23/20 12:42














Review of Systems


ROS Statement: 


Those systems with pertinent positive or pertinent negative responses have been 

documented in the HPI.





ROS Other: All systems not noted in ROS Statement are negative.





Past Medical History


Past Medical History: Heart Failure, COPD, Diabetes Mellitus, Hearing Disorder /

 Deafness, Hypertension, Musculoskeletal Disorder, Pneumonia


Additional Past Medical History / Comment(s): HYDROCEPHALUS.  CARDIOMYOPATHY.  

SEE DR CUELLAR'S H&P.  GOUT.


History of Any Multi-Drug Resistant Organisms: None Reported


Past Surgical History: Ventriculoperitoneal Shunt


Additional Past Surgical History / Comment(s): SHUNT 2/16/15.


Past Anesthesia/Blood Transfusion Reactions: No Reported Reaction


Past Psychological History: No Psychological Hx Reported





General Exam


General appearance: alert, in no apparent distress


Head exam: Present: atraumatic, normocephalic, normal inspection


Eye exam: Present: normal appearance, PERRL, EOMI.  Absent: scleral icterus, 

conjunctival injection, periorbital swelling


ENT exam: Present: normal exam, mucous membranes moist


Neck exam: Present: normal inspection.  Absent: tenderness, meningismus, lympha

denopathy


Respiratory exam: Present: normal lung sounds bilaterally.  Absent: respiratory 

distress, wheezes, rales, rhonchi, stridor


Cardiovascular Exam: Present: regular rate, normal rhythm, normal heart sounds. 

 Absent: systolic murmur, diastolic murmur, rubs, gallop, clicks


GI/Abdominal exam: Present: soft, normal bowel sounds.  Absent: distended, 

tenderness, guarding, rebound, rigid


Extremities exam: Present: normal inspection, full ROM, normal capillary refill.

  Absent: tenderness, pedal edema, joint swelling, calf tenderness


Back exam: Present: normal inspection


Neurological exam: Present: alert, oriented X3, CN II-XII intact


Psychiatric exam: Present: normal affect, normal mood


Skin exam: Present: warm, dry, intact, normal color.  Absent: rash





Course


                                   Vital Signs











  10/01/22 10/01/22 10/01/22





  22:51 23:04 23:06


 


Temperature 97.6 F  


 


Pulse Rate 133 H  131 H


 


Respiratory 26 H  





Rate   


 


Blood Pressure 142/99  


 


O2 Sat by Pulse 100 96 





Oximetry   














  10/01/22 10/01/22 10/01/22





  23:21 23:35 23:37


 


Temperature   97.6 F


 


Pulse Rate 129 H 139 H 


 


Respiratory  27 H 





Rate   


 


Blood Pressure  97/73 


 


O2 Sat by Pulse  30 L 





Oximetry   














EKG Findings





- EKG Comments:


EKG Findings:: EKG is a flutter 136  QTc 414





Medical Decision Making





- Lab Data


Result diagrams: 


                                 10/01/22 23:21





                                 10/01/22 23:21


                                   Lab Results











  10/01/22 10/01/22 10/01/22 Range/Units





  23:21 23:21 23:21 


 


WBC  10.1    (3.8-10.6)  k/uL


 


RBC  4.74    (4.30-5.90)  m/uL


 


Hgb  15.1    (13.0-17.5)  gm/dL


 


Hct  46.7    (39.0-53.0)  %


 


MCV  98.4    (80.0-100.0)  fL


 


MCH  31.8    (25.0-35.0)  pg


 


MCHC  32.4    (31.0-37.0)  g/dL


 


RDW  13.1    (11.5-15.5)  %


 


Plt Count  152    (150-450)  k/uL


 


MPV  10.0    


 


Neutrophils %  82    %


 


Lymphocytes %  10    %


 


Monocytes %  6    %


 


Eosinophils %  1    %


 


Basophils %  0    %


 


Neutrophils #  8.3 H    (1.3-7.7)  k/uL


 


Lymphocytes #  1.0    (1.0-4.8)  k/uL


 


Monocytes #  0.6    (0-1.0)  k/uL


 


Eosinophils #  0.1    (0-0.7)  k/uL


 


Basophils #  0.0    (0-0.2)  k/uL


 


Hypochromasia  Moderate    


 


PT   20.3 H   (9.0-12.0)  sec


 


INR   2.0 H   (<1.2)  


 


APTT   29.0   (22.0-30.0)  sec


 


Sodium     (137-145)  mmol/L


 


Potassium     (3.5-5.1)  mmol/L


 


Chloride     ()  mmol/L


 


Carbon Dioxide     (22-30)  mmol/L


 


Anion Gap     mmol/L


 


BUN     (9-20)  mg/dL


 


Creatinine     (0.66-1.25)  mg/dL


 


Est GFR (CKD-EPI)AfAm     (>60 ml/min/1.73 sqM)  


 


Est GFR (CKD-EPI)NonAf     (>60 ml/min/1.73 sqM)  


 


Glucose     (74-99)  mg/dL


 


Plasma Lactic Acid Patrick    7.8 H*  (0.7-2.0)  mmol/L


 


Calcium     (8.4-10.2)  mg/dL


 


Phosphorus     (2.5-4.5)  mg/dL


 


Magnesium     (1.6-2.3)  mg/dL


 


Total Bilirubin     (0.2-1.3)  mg/dL


 


AST     (17-59)  U/L


 


ALT     (4-49)  U/L


 


Alkaline Phosphatase     ()  U/L


 


Troponin I     (0.000-0.034)  ng/mL


 


Total Protein     (6.3-8.2)  g/dL


 


Albumin     (3.5-5.0)  g/dL


 


TSH     (0.465-4.680)  mIU/L














  10/01/22 10/01/22 Range/Units





  23:21 23:21 


 


WBC    (3.8-10.6)  k/uL


 


RBC    (4.30-5.90)  m/uL


 


Hgb    (13.0-17.5)  gm/dL


 


Hct    (39.0-53.0)  %


 


MCV    (80.0-100.0)  fL


 


MCH    (25.0-35.0)  pg


 


MCHC    (31.0-37.0)  g/dL


 


RDW    (11.5-15.5)  %


 


Plt Count    (150-450)  k/uL


 


MPV    


 


Neutrophils %    %


 


Lymphocytes %    %


 


Monocytes %    %


 


Eosinophils %    %


 


Basophils %    %


 


Neutrophils #    (1.3-7.7)  k/uL


 


Lymphocytes #    (1.0-4.8)  k/uL


 


Monocytes #    (0-1.0)  k/uL


 


Eosinophils #    (0-0.7)  k/uL


 


Basophils #    (0-0.2)  k/uL


 


Hypochromasia    


 


PT    (9.0-12.0)  sec


 


INR    (<1.2)  


 


APTT    (22.0-30.0)  sec


 


Sodium   132 L  (137-145)  mmol/L


 


Potassium   5.8 H  (3.5-5.1)  mmol/L


 


Chloride   93 L  ()  mmol/L


 


Carbon Dioxide   16 L  (22-30)  mmol/L


 


Anion Gap   23  mmol/L


 


BUN   19  (9-20)  mg/dL


 


Creatinine   2.06 H  (0.66-1.25)  mg/dL


 


Est GFR (CKD-EPI)AfAm   35  (>60 ml/min/1.73 sqM)  


 


Est GFR (CKD-EPI)NonAf   31  (>60 ml/min/1.73 sqM)  


 


Glucose   139 H  (74-99)  mg/dL


 


Plasma Lactic Acid Patrick    (0.7-2.0)  mmol/L


 


Calcium   8.7  (8.4-10.2)  mg/dL


 


Phosphorus   5.1 H  (2.5-4.5)  mg/dL


 


Magnesium   2.5 H  (1.6-2.3)  mg/dL


 


Total Bilirubin   1.8 H  (0.2-1.3)  mg/dL


 


AST   78 H  (17-59)  U/L


 


ALT   39  (4-49)  U/L


 


Alkaline Phosphatase   47  ()  U/L


 


Troponin I  0.104 H*   (0.000-0.034)  ng/mL


 


Total Protein   6.9  (6.3-8.2)  g/dL


 


Albumin   4.3  (3.5-5.0)  g/dL


 


TSH   0.112 L  (0.465-4.680)  mIU/L














Disposition


Clinical Impression: 


 Acute pulmonary edema, Acute exacerbation of chronic obstructive pulmonary 

disease, Hypoxia, Atrial fibrillation with rapid ventricular response, Weakness,

 AMS (altered mental status), Elevated troponin





Disposition: ADMITTED AS IP TO THIS HOSP


Condition: Serious


Is patient prescribed a controlled substance at d/c from ED?: No


Referrals: 


[facility] [Primary Care Provider] - 1-2 days

## 2022-10-01 NOTE — XR
EXAMINATION TYPE: XR chest 1V portable

 

DATE OF EXAM: 10/1/2022

 

COMPARISON: 3/23/2022

 

HISTORY: Chest pain

 

TECHNIQUE: Single view

 

FINDINGS: There is no heart failure nor confluent pneumonic infiltrate. There is slight blunting righ
t costophrenic angle. There is left axillary pacemaker. There are chest leads. Heart appears slightly
 enlarged. There is right-sided ventricular shunt catheter not well evaluated.

 

IMPRESSION: Mild cardiomegaly. No heart failure. There is probably a tiny right pleural effusion.

## 2022-10-02 VITALS — TEMPERATURE: 96.6 F

## 2022-10-02 LAB
ALBUMIN SERPL-MCNC: 3.9 G/DL (ref 3.5–5)
ALP SERPL-CCNC: 46 U/L (ref 38–126)
ALT SERPL-CCNC: 51 U/L (ref 4–49)
ANION GAP SERPL CALC-SCNC: 15 MMOL/L
AST SERPL-CCNC: 131 U/L (ref 17–59)
BUN SERPL-SCNC: 21 MG/DL (ref 9–20)
CALCIUM SPEC-MCNC: 7.5 MG/DL (ref 8.4–10.2)
CHLORIDE SERPL-SCNC: 101 MMOL/L (ref 98–107)
CO2 SERPL-SCNC: 17 MMOL/L (ref 22–30)
GLUCOSE BLD-MCNC: 182 MG/DL (ref 70–110)
GLUCOSE BLD-MCNC: 281 MG/DL (ref 70–110)
GLUCOSE SERPL-MCNC: 185 MG/DL (ref 74–99)
POTASSIUM SERPL-SCNC: 5.4 MMOL/L (ref 3.5–5.1)
PROT SERPL-MCNC: 6.4 G/DL (ref 6.3–8.2)
SODIUM SERPL-SCNC: 133 MMOL/L (ref 137–145)

## 2022-10-02 RX ADMIN — ISODIUM CHLORIDE SCH: 0.03 SOLUTION RESPIRATORY (INHALATION) at 04:28

## 2022-10-02 RX ADMIN — ISODIUM CHLORIDE SCH: 0.03 SOLUTION RESPIRATORY (INHALATION) at 08:26

## 2022-10-02 NOTE — ED
Medical Decision Making





- Medical Decision Making





Patient was waiting in the emergency room for a bed upstairs, patient had been 

made comfort care and was awaiting hospice evaluation.  Patient deteriorated, 

bradycardia with agonal respirations and ultimately became asystole.  Time of 

death 0912 





- Lab Data


Result diagrams: 


                                 10/01/22 23:21





                                 10/02/22 05:08





                                   Lab Results











  10/01/22 10/01/22 10/01/22 Range/Units





  23:21 23:21 23:21 


 


WBC  10.1    (3.8-10.6)  k/uL


 


RBC  4.74    (4.30-5.90)  m/uL


 


Hgb  15.1    (13.0-17.5)  gm/dL


 


Hct  46.7    (39.0-53.0)  %


 


MCV  98.4    (80.0-100.0)  fL


 


MCH  31.8    (25.0-35.0)  pg


 


MCHC  32.4    (31.0-37.0)  g/dL


 


RDW  13.1    (11.5-15.5)  %


 


Plt Count  152    (150-450)  k/uL


 


MPV  10.0    


 


Neutrophils %  82    %


 


Lymphocytes %  10    %


 


Monocytes %  6    %


 


Eosinophils %  1    %


 


Basophils %  0    %


 


Neutrophils #  8.3 H    (1.3-7.7)  k/uL


 


Lymphocytes #  1.0    (1.0-4.8)  k/uL


 


Monocytes #  0.6    (0-1.0)  k/uL


 


Eosinophils #  0.1    (0-0.7)  k/uL


 


Basophils #  0.0    (0-0.2)  k/uL


 


Hypochromasia  Moderate    


 


PT   20.3 H   (9.0-12.0)  sec


 


INR   2.0 H   (<1.2)  


 


APTT   29.0   (22.0-30.0)  sec


 


Sodium     (137-145)  mmol/L


 


Potassium     (3.5-5.1)  mmol/L


 


Chloride     ()  mmol/L


 


Carbon Dioxide     (22-30)  mmol/L


 


Anion Gap     mmol/L


 


BUN     (9-20)  mg/dL


 


Creatinine     (0.66-1.25)  mg/dL


 


Est GFR (CKD-EPI)AfAm     (>60 ml/min/1.73 sqM)  


 


Est GFR (CKD-EPI)NonAf     (>60 ml/min/1.73 sqM)  


 


Glucose     (74-99)  mg/dL


 


Lactic Ac Sepsis Rflx     


 


Plasma Lactic Acid Patrick    7.8 H*  (0.7-2.0)  mmol/L


 


Calcium     (8.4-10.2)  mg/dL


 


Phosphorus     (2.5-4.5)  mg/dL


 


Magnesium     (1.6-2.3)  mg/dL


 


Total Bilirubin     (0.2-1.3)  mg/dL


 


AST     (17-59)  U/L


 


ALT     (4-49)  U/L


 


Alkaline Phosphatase     ()  U/L


 


Troponin I     (0.000-0.034)  ng/mL


 


NT-Pro-B Natriuret Pep     pg/mL


 


Total Protein     (6.3-8.2)  g/dL


 


Albumin     (3.5-5.0)  g/dL


 


TSH     (0.465-4.680)  mIU/L














  10/01/22 10/01/22 10/01/22 Range/Units





  23:21 23:21 23:21 


 


WBC     (3.8-10.6)  k/uL


 


RBC     (4.30-5.90)  m/uL


 


Hgb     (13.0-17.5)  gm/dL


 


Hct     (39.0-53.0)  %


 


MCV     (80.0-100.0)  fL


 


MCH     (25.0-35.0)  pg


 


MCHC     (31.0-37.0)  g/dL


 


RDW     (11.5-15.5)  %


 


Plt Count     (150-450)  k/uL


 


MPV     


 


Neutrophils %     %


 


Lymphocytes %     %


 


Monocytes %     %


 


Eosinophils %     %


 


Basophils %     %


 


Neutrophils #     (1.3-7.7)  k/uL


 


Lymphocytes #     (1.0-4.8)  k/uL


 


Monocytes #     (0-1.0)  k/uL


 


Eosinophils #     (0-0.7)  k/uL


 


Basophils #     (0-0.2)  k/uL


 


Hypochromasia     


 


PT     (9.0-12.0)  sec


 


INR     (<1.2)  


 


APTT     (22.0-30.0)  sec


 


Sodium   132 L   (137-145)  mmol/L


 


Potassium   5.8 H   (3.5-5.1)  mmol/L


 


Chloride   93 L   ()  mmol/L


 


Carbon Dioxide   16 L   (22-30)  mmol/L


 


Anion Gap   23   mmol/L


 


BUN   19   (9-20)  mg/dL


 


Creatinine   2.06 H   (0.66-1.25)  mg/dL


 


Est GFR (CKD-EPI)AfAm   35   (>60 ml/min/1.73 sqM)  


 


Est GFR (CKD-EPI)NonAf   31   (>60 ml/min/1.73 sqM)  


 


Glucose   139 H   (74-99)  mg/dL


 


Lactic Ac Sepsis Rflx     


 


Plasma Lactic Acid Patrick     (0.7-2.0)  mmol/L


 


Calcium   8.7   (8.4-10.2)  mg/dL


 


Phosphorus   5.1 H   (2.5-4.5)  mg/dL


 


Magnesium   2.5 H   (1.6-2.3)  mg/dL


 


Total Bilirubin   1.8 H   (0.2-1.3)  mg/dL


 


AST   78 H   (17-59)  U/L


 


ALT   39   (4-49)  U/L


 


Alkaline Phosphatase   47   ()  U/L


 


Troponin I  0.104 H*    (0.000-0.034)  ng/mL


 


NT-Pro-B Natriuret Pep    05744  pg/mL


 


Total Protein   6.9   (6.3-8.2)  g/dL


 


Albumin   4.3   (3.5-5.0)  g/dL


 


TSH   0.112 L   (0.465-4.680)  mIU/L














  10/01/22 Range/Units





  23:56 


 


WBC   (3.8-10.6)  k/uL


 


RBC   (4.30-5.90)  m/uL


 


Hgb   (13.0-17.5)  gm/dL


 


Hct   (39.0-53.0)  %


 


MCV   (80.0-100.0)  fL


 


MCH   (25.0-35.0)  pg


 


MCHC   (31.0-37.0)  g/dL


 


RDW   (11.5-15.5)  %


 


Plt Count   (150-450)  k/uL


 


MPV   


 


Neutrophils %   %


 


Lymphocytes %   %


 


Monocytes %   %


 


Eosinophils %   %


 


Basophils %   %


 


Neutrophils #   (1.3-7.7)  k/uL


 


Lymphocytes #   (1.0-4.8)  k/uL


 


Monocytes #   (0-1.0)  k/uL


 


Eosinophils #   (0-0.7)  k/uL


 


Basophils #   (0-0.2)  k/uL


 


Hypochromasia   


 


PT   (9.0-12.0)  sec


 


INR   (<1.2)  


 


APTT   (22.0-30.0)  sec


 


Sodium   (137-145)  mmol/L


 


Potassium   (3.5-5.1)  mmol/L


 


Chloride   ()  mmol/L


 


Carbon Dioxide   (22-30)  mmol/L


 


Anion Gap   mmol/L


 


BUN   (9-20)  mg/dL


 


Creatinine   (0.66-1.25)  mg/dL


 


Est GFR (CKD-EPI)AfAm   (>60 ml/min/1.73 sqM)  


 


Est GFR (CKD-EPI)NonAf   (>60 ml/min/1.73 sqM)  


 


Glucose   (74-99)  mg/dL


 


Lactic Ac Sepsis Rflx  Y  


 


Plasma Lactic Acid Patrick   (0.7-2.0)  mmol/L


 


Calcium   (8.4-10.2)  mg/dL


 


Phosphorus   (2.5-4.5)  mg/dL


 


Magnesium   (1.6-2.3)  mg/dL


 


Total Bilirubin   (0.2-1.3)  mg/dL


 


AST   (17-59)  U/L


 


ALT   (4-49)  U/L


 


Alkaline Phosphatase   ()  U/L


 


Troponin I   (0.000-0.034)  ng/mL


 


NT-Pro-B Natriuret Pep   pg/mL


 


Total Protein   (6.3-8.2)  g/dL


 


Albumin   (3.5-5.0)  g/dL


 


TSH   (0.465-4.680)  mIU/L














Disposition


Clinical Impression: 


 Acute pulmonary edema, Acute exacerbation of chronic obstructive pulmonary 

disease, Hypoxia, Atrial fibrillation with rapid ventricular response, Weakness,

AMS (altered mental status), Elevated troponin





Disposition: ADMITTED AS IP TO THIS HOSP


Condition: Serious

## 2022-10-02 NOTE — P.DS
Providers


Date of admission: 


10/02/22 00:30





Expected date of discharge: 10/02/22


Attending physician: 


Jose Friedman MD





Consults: 





                                        





10/02/22 00:30


Consult Physician Routine 


   Consulting Provider: Court Mooney


   Consult Reason/Comments: afib,inctrop


   Do you want consulting provider notified?: Yes











Primary care physician: 


[facility]





Hospital Course: 





Discharge Diagnosis:


Atrial fibrillation with RVR


Acute COPD exacerbation


Lactic acidosis


MASON on CKD 


Elevated troponin


Hyperkalemia








Hospital Course: 


77-year-old male with history of CHF status post ICD, atrial fibrillation, COPD,

history of hydrocephalus status post shunt presented initially with worsening 

shortness of breath, increased heart rate.  Patient was also acutely 

encephalopathic.  Wife was at bedside.  Initial workup showed potassium of 5.8, 

creatinine of 2.06, lactic acid of 7.8, troponin 0.104.  His TSH was low at 

0.112.  EKG was consistent with atrial flutter with RVR.  Chest x-ray showed 

tiny right pleural effusion.  CT head did not show any new changes.  Over the 

course of his brief auscultation in the emergency patient was very agitated and 

combative.  He is also having worsening shortness of breath, hypoxia.  Over the 

course of the morning his blood pressure down trended.  Due to worsening medical

condition, wife decided to make him comfort care measures only.





Patient passed away at 9:12 on 10/2/22.








Patient Condition at Discharge: Undetermined





Plan - Discharge Summary


New Discharge Prescriptions: 


No Action


   Budesonide-Formot 160-4.5 Mcg [Symbicort 160-4.5 Mcg Inhaler] 2 puff 

INHALATION RT-DAILY


   Furosemide 40 mg PO DAILY


   allopurinoL [Allopurinol] 2 cap PO BID


   Albuterol Nebulized [Ventolin Nebulized] 2.5 mg INHALATION RT-Q4H


   Albuterol Sulfate [Proair Hfa] 2 puff INHALATION RT-DAILY


   Aspirin EC [Ecotrin Low Dose] 81 mg PO DAILY


   calcitrioL [Rocaltrol] 0.25 mcg PO MO


   Ketoconazole 2% Shampoo [Nizoral] 1 applic TOPICAL AS DIRECTED


   Rosuvastatin Calcium [Crestor] 40 mg PO DAILY


   Tiotropium 18 Mcg/Puff [Spiriva] 1 puff INHALATION RT-DAILY


   Docusate [Colace] 100 mg PO DAILY


   Metoprolol Tartrate [Lopressor] 100 mg PO BID  tab


   Apixaban [Eliquis] 5 mg PO BID #60 tab


Discharge Medication List





Budesonide-Formot 160-4.5 Mcg [Symbicort 160-4.5 Mcg Inhaler] 2 puff INHALATION 

RT-DAILY 04/07/15 [History]


Furosemide 40 mg PO DAILY 04/07/15 [History]


allopurinoL [Allopurinol] 2 cap PO BID 04/07/15 [History]


Albuterol Nebulized [Ventolin Nebulized] 2.5 mg INHALATION RT-Q4H 01/23/20 

[History]


Albuterol Sulfate [Proair Hfa] 2 puff INHALATION RT-DAILY 01/23/20 [History]


Aspirin EC [Ecotrin Low Dose] 81 mg PO DAILY 01/23/20 [History]


Docusate [Colace] 100 mg PO DAILY 01/23/20 [History]


Ketoconazole 2% Shampoo [Nizoral] 1 applic TOPICAL AS DIRECTED 01/23/20 

[History]


Rosuvastatin Calcium [Crestor] 40 mg PO DAILY 01/23/20 [History]


Tiotropium 18 Mcg/Puff [Spiriva] 1 puff INHALATION RT-DAILY 01/23/20 [History]


calcitrioL [Rocaltrol] 0.25 mcg PO MO 01/23/20 [History]


Apixaban [Eliquis] 5 mg PO BID #60 tab 01/25/20 [Rx]


Metoprolol Tartrate [Lopressor] 100 mg PO BID  tab 01/25/20 [Rx]








Follow up Appointment(s)/Referral(s): 


[facility] [Primary Care Provider] - 1-2 days

## 2022-10-02 NOTE — CT
EXAMINATION TYPE: CT brain wo con

 

DATE OF EXAM: 10/1/2022

 

COMPARISON: 6/17/2016

 

HISTORY: AMS.  prior on PACS

 

CT DLP: 2316.4 mGycm

Automated exposure control for dose reduction was used.

 

CT brain without contrast.

 

There is cerebral cortical atrophy. There is no mass effect or midline shift. There is no sign of int
racranial hemorrhage. There is a right side shunt catheter with the tip in the anterior aspect of the
 right temporal lobe near the hippocampus. No hydrocephalus. The calvarium is intact. Skull base is i
ntact. There is normal aeration of the mastoid sinuses.

 

IMPRESSION:

Right-sided shunt catheter with the tip in the region of temporal horn of the right lateral ventricle
 and not changed in position compared to old exam. No hydrocephalus. Cerebral atrophy. No acute intra
cranial abnormality. There is clearing of the bilateral maxillary sinusitis compared to old exam.

## 2022-10-02 NOTE — P.MHFACE
Face to Face Restrain/Seclus





- Evaluation


Patient's Immediate Situation: Endangers self safety, Violent behavior


Patient's Reaction to the Intervention: Uncooperative, Anxious, Combative, 

Restless, Resistive to care


Patient's Medical & Behavioral Condition: Awake, Confused, Agitated


Need to Continue or Terminate Restraint or Seclusion: Continue


Face to Face Eval of Restraint Date: 10/02/22


Face to Face Eval of Restraint Time: 03:35

## 2022-10-02 NOTE — P.HPIM
History of Present Illness


H&P Date: 10/02/22


Chief Complaint: increase SOB





77 year old male with CHF s/p ICD, afib on eliquis , COPD with emphysema , h/o 

hydrocephalus s./p shunt. 





patient is confused and combative unable to provide any meaningful history , 

wife at bed side answering all questions,. she reports a week long of gradually 

worsening SOB, and increase in his heart rate , no report of coughing, fever, 

chills, or chest pain. wife her self , provides only limited history , as she 

reports that the patient has been resisting care, refusing to take meds, and 

would like to be left alone. she has been worried about his well being , and 

forced him to come to the hospital today as he was getting worse overall. no 

report of GI bleeding. 





workup in the ED showed Afib wtih RVR


CXR no kerry CHF


blood work showed elevated trops , trending down 


CKD with possible worsening renal function 





however, patient is combative and refusing care, he pulls on his IVs 





Review of Systems


ROS unobtainable: due to mental status





Past Medical History


Past Medical History: Heart Failure, COPD, Diabetes Mellitus, Hearing Disorder /

Deafness, Hypertension, Musculoskeletal Disorder, Pneumonia


Additional Past Medical History / Comment(s): HYDROCEPHALUS.  CARDIOMYOPATHY.  

SEE DR CUELLAR'S H&P.  GOUT.


History of Any Multi-Drug Resistant Organisms: None Reported


Past Surgical History: Ventriculoperitoneal Shunt


Additional Past Surgical History / Comment(s): SHUNT 2/16/15.


Past Anesthesia/Blood Transfusion Reactions: No Reported Reaction


Past Psychological History: No Psychological Hx Reported





- Past Family History


  ** family 


Family Medical History: Coronary Artery Disease (CAD)





Medications and Allergies


                                Home Medications











 Medication  Instructions  Recorded  Confirmed  Type


 


Budesonide-Formot 160-4.5 Mcg 2 puff INHALATION RT-DAILY 04/07/15 01/23/20 

History





[Symbicort 160-4.5 Mcg Inhaler]    


 


Furosemide 40 mg PO DAILY 04/07/15 01/23/20 History


 


allopurinoL [Allopurinol] 2 cap PO BID 04/07/15 01/23/20 History


 


Albuterol Nebulized [Ventolin 2.5 mg INHALATION RT-Q4H 01/23/20 01/23/20 History





Nebulized]    


 


Albuterol Sulfate [Proair Hfa] 2 puff INHALATION RT-DAILY 01/23/20 01/23/20 

History


 


Aspirin EC [Ecotrin Low Dose] 81 mg PO DAILY 01/23/20 01/23/20 History


 


Docusate [Colace] 100 mg PO DAILY 01/23/20 01/23/20 History


 


Ketoconazole 2% Shampoo [Nizoral] 1 applic TOPICAL AS DIRECTED 01/23/20 01/23/20

 History


 


Rosuvastatin Calcium [Crestor] 40 mg PO DAILY 01/23/20 01/23/20 History


 


Tiotropium 18 Mcg/Puff [Spiriva] 1 puff INHALATION RT-DAILY 01/23/20 01/23/20 

History


 


calcitrioL [Rocaltrol] 0.25 mcg PO MO 01/23/20 01/23/20 History


 


Apixaban [Eliquis] 5 mg PO BID #60 tab 01/25/20  Rx


 


Metoprolol Tartrate [Lopressor] 100 mg PO BID  tab 01/25/20  Rx








                                    Allergies











Allergy/AdvReac Type Severity Reaction Status Date / Time


 


No Known Allergies Allergy   Verified 01/23/20 12:42














Physical Exam


Vitals: 


                                   Vital Signs











  Temp Pulse Resp BP Pulse Ox


 


 10/02/22 03:44   115 H  30 H  103/84  98


 


 10/02/22 02:34   112 H  24  106/87  97


 


 10/02/22 02:02   102 H   90/74 


 


 10/02/22 01:53   133 H   111/82 


 


 10/02/22 01:32   138 H  26 H  106/81  98


 


 10/02/22 01:10   139 H   


 


 10/02/22 00:55   112 H  26 H  98/78  95


 


 10/02/22 00:54   118 H   


 


 10/01/22 23:37  97.6 F    


 


 10/01/22 23:35   139 H  27 H  97/73  30 L


 


 10/01/22 23:21   129 H   


 


 10/01/22 23:06   131 H   


 


 10/01/22 23:04      96


 


 10/01/22 22:51  97.6 F  133 H  26 H  142/99  100








                                Intake and Output











 10/01/22 10/01/22 10/02/22





 14:59 22:59 06:59


 


Other:   


 


  Weight  64.773 kg 














limited exam , patient resists care and combative





  Constitutional:          combative uncooperative, resists care , does not 

answer any questions


Eyes:      resists opening his eyes for exam





ENMT:      NC/AT


         cant examine his Oropharynx 





Neck:      


         No carotid bruits


         No thyromegaly





Lungs:      diminished breath sounds with expiratory wheezing 


         Clear to percussion








Cardiovascular:      Heart tachycardia 


         No murmurs, gallops, or rubs


         No peripheral edema





Abdominal:       Soft


         Nontender, no guarding, rebound or rigidity


         Abdomen moving with respiration


         Normoactive bowel sounds


         No hepatomegaly, No splenomegaly


         No palpable mass 


         No abdominal wall hernia noted 





Skin:      Normal temperature, tone, texture, turgor





Psychiatric:      awake, combative, resists care





Neuro      very strong , kicking and punching, resisting care. 


Lymphatics:       no palpable cervical or supraclavicular lymph nodes  





Results


CBC & Chem 7: 


                                 10/01/22 23:21





                                 10/01/22 23:21


Labs: 


                  Abnormal Lab Results - Last 24 Hours (Table)











  10/01/22 10/01/22 10/01/22 Range/Units





  23:21 23:21 23:21 


 


Neutrophils #  8.3 H    (1.3-7.7)  k/uL


 


PT   20.3 H   (9.0-12.0)  sec


 


INR   2.0 H   (<1.2)  


 


Sodium     (137-145)  mmol/L


 


Potassium     (3.5-5.1)  mmol/L


 


Chloride     ()  mmol/L


 


Carbon Dioxide     (22-30)  mmol/L


 


Creatinine     (0.66-1.25)  mg/dL


 


Glucose     (74-99)  mg/dL


 


POC Glucose (mg/dL)     ()  mg/dL


 


Plasma Lactic Acid Patrick    7.8 H*  (0.7-2.0)  mmol/L


 


Phosphorus     (2.5-4.5)  mg/dL


 


Magnesium     (1.6-2.3)  mg/dL


 


Total Bilirubin     (0.2-1.3)  mg/dL


 


AST     (17-59)  U/L


 


Troponin I     (0.000-0.034)  ng/mL


 


TSH     (0.465-4.680)  mIU/L














  10/01/22 10/01/22 10/02/22 Range/Units





  23:21 23:21 02:23 


 


Neutrophils #     (1.3-7.7)  k/uL


 


PT     (9.0-12.0)  sec


 


INR     (<1.2)  


 


Sodium   132 L   (137-145)  mmol/L


 


Potassium   5.8 H   (3.5-5.1)  mmol/L


 


Chloride   93 L   ()  mmol/L


 


Carbon Dioxide   16 L   (22-30)  mmol/L


 


Creatinine   2.06 H   (0.66-1.25)  mg/dL


 


Glucose   139 H   (74-99)  mg/dL


 


POC Glucose (mg/dL)     ()  mg/dL


 


Plasma Lactic Acid Patrick     (0.7-2.0)  mmol/L


 


Phosphorus   5.1 H   (2.5-4.5)  mg/dL


 


Magnesium   2.5 H   (1.6-2.3)  mg/dL


 


Total Bilirubin   1.8 H   (0.2-1.3)  mg/dL


 


AST   78 H   (17-59)  U/L


 


Troponin I  0.104 H*   0.084 H*  (0.000-0.034)  ng/mL


 


TSH   0.112 L   (0.465-4.680)  mIU/L














  10/02/22 10/02/22 Range/Units





  02:23 03:35 


 


Neutrophils #    (1.3-7.7)  k/uL


 


PT    (9.0-12.0)  sec


 


INR    (<1.2)  


 


Sodium    (137-145)  mmol/L


 


Potassium    (3.5-5.1)  mmol/L


 


Chloride    ()  mmol/L


 


Carbon Dioxide    (22-30)  mmol/L


 


Creatinine    (0.66-1.25)  mg/dL


 


Glucose    (74-99)  mg/dL


 


POC Glucose (mg/dL)   182 H  ()  mg/dL


 


Plasma Lactic Acid Patrick  3.8 H*   (0.7-2.0)  mmol/L


 


Phosphorus    (2.5-4.5)  mg/dL


 


Magnesium    (1.6-2.3)  mg/dL


 


Total Bilirubin    (0.2-1.3)  mg/dL


 


AST    (17-59)  U/L


 


Troponin I    (0.000-0.034)  ng/mL


 


TSH    (0.465-4.680)  mIU/L














Assessment and Plan


Assessment: 





afib with RVR


acute COPD exacerbation 


lactic acidosis 


MASON on CKD


elevated trops , trending down


hyperkalemia 





plan 


duonb PRN q2hr 


supplemental oxygen 


systemic IV steroids 


doxy BID 


check covid , influenza





IVF hydration with normall saline , avoid fluid overload. serial lung exam 


follow up lactic acid 





trops trending down 


monitor renal function 


monitor urine output 





given bolus cardizem , HR under better control , no cardizem gtt was started 


continue eliquis 


patient is not currently on metoprolol at home 





IV insulin 10 units with 1 amp D 50 


follow up K level 





full code


DVT PPX on eliquis for afib